# Patient Record
Sex: MALE | Race: BLACK OR AFRICAN AMERICAN | Employment: FULL TIME | URBAN - METROPOLITAN AREA
[De-identification: names, ages, dates, MRNs, and addresses within clinical notes are randomized per-mention and may not be internally consistent; named-entity substitution may affect disease eponyms.]

---

## 2021-03-06 ENCOUNTER — HOSPITAL ENCOUNTER (EMERGENCY)
Age: 31
Discharge: HOME | End: 2021-03-06
Attending: EMERGENCY MEDICINE
Payer: COMMERCIAL

## 2021-03-06 VITALS
DIASTOLIC BLOOD PRESSURE: 86 MMHG | TEMPERATURE: 98.3 F | HEART RATE: 116 BPM | OXYGEN SATURATION: 94 % | RESPIRATION RATE: 20 BRPM | HEIGHT: 65 IN | BODY MASS INDEX: 37.49 KG/M2 | SYSTOLIC BLOOD PRESSURE: 130 MMHG | WEIGHT: 225 LBS

## 2021-03-06 DIAGNOSIS — R52 BODY ACHES: ICD-10-CM

## 2021-03-06 DIAGNOSIS — U07.1 COVID-19 VIRUS INFECTION: Primary | ICD-10-CM

## 2021-03-06 DIAGNOSIS — R11.0 NAUSEA WITHOUT VOMITING: ICD-10-CM

## 2021-03-06 DIAGNOSIS — R50.9 FEVER, UNSPECIFIED FEVER CAUSE: ICD-10-CM

## 2021-03-06 PROCEDURE — 99283 EMERGENCY DEPT VISIT LOW MDM: CPT

## 2021-03-06 RX ORDER — IBUPROFEN 800 MG/1
800 TABLET ORAL
Qty: 20 TAB | Refills: 0 | OUTPATIENT
Start: 2021-03-06 | End: 2021-03-06 | Stop reason: SDUPTHER

## 2021-03-06 RX ORDER — IBUPROFEN 800 MG/1
800 TABLET ORAL
Qty: 20 TAB | Refills: 0 | Status: SHIPPED | OUTPATIENT
Start: 2021-03-06 | End: 2021-03-13

## 2021-03-06 RX ORDER — ONDANSETRON 4 MG/1
4 TABLET, ORALLY DISINTEGRATING ORAL
Qty: 8 TAB | Refills: 0 | OUTPATIENT
Start: 2021-03-06 | End: 2021-03-06 | Stop reason: SDUPTHER

## 2021-03-06 RX ORDER — ONDANSETRON 4 MG/1
4 TABLET, ORALLY DISINTEGRATING ORAL
Qty: 8 TAB | Refills: 0 | Status: SHIPPED | OUTPATIENT
Start: 2021-03-06

## 2021-03-07 ENCOUNTER — PATIENT OUTREACH (OUTPATIENT)
Dept: CASE MANAGEMENT | Age: 31
End: 2021-03-07

## 2021-03-07 NOTE — ED TRIAGE NOTES
Patient reports testing positive for Covid on Sunday. Stated that feels generalized body aches, fever at home, shortness of breath, sweating. Denies chest pain, vomiting,and nausea. Patient is 94% on room air in ED after ambulating.

## 2021-03-07 NOTE — ED PROVIDER NOTES
35yo M with known COVID + 6 days ago. Pt thinks he might be dehydrated. Continues to have sob and chills. + bodyaches. + cough. Temp at home was 103 yesterday. Feels better after taking Tylenol. No vomiting.  + diarrhea. No past medical history on file. Past Surgical History:   Procedure Laterality Date    HX TONSILLECTOMY           No family history on file. Social History     Socioeconomic History    Marital status: SINGLE     Spouse name: Not on file    Number of children: Not on file    Years of education: Not on file    Highest education level: Not on file   Occupational History    Not on file   Social Needs    Financial resource strain: Not on file    Food insecurity     Worry: Not on file     Inability: Not on file    Transportation needs     Medical: Not on file     Non-medical: Not on file   Tobacco Use    Smoking status: Never Smoker   Substance and Sexual Activity    Alcohol use: Not on file     Comment: social    Drug use: Not on file    Sexual activity: Not on file   Lifestyle    Physical activity     Days per week: Not on file     Minutes per session: Not on file    Stress: Not on file   Relationships    Social connections     Talks on phone: Not on file     Gets together: Not on file     Attends Mandaeism service: Not on file     Active member of club or organization: Not on file     Attends meetings of clubs or organizations: Not on file     Relationship status: Not on file    Intimate partner violence     Fear of current or ex partner: Not on file     Emotionally abused: Not on file     Physically abused: Not on file     Forced sexual activity: Not on file   Other Topics Concern    Not on file   Social History Narrative    Not on file         ALLERGIES: Carrot    Review of Systems   Constitutional: Positive for chills and fever. Negative for diaphoresis. HENT: Negative for facial swelling. Eyes: Negative for visual disturbance.    Respiratory: Positive for cough. Cardiovascular: Negative for chest pain. Gastrointestinal: Positive for nausea. Negative for abdominal pain. Genitourinary: Negative for dysuria. Musculoskeletal: Negative for joint swelling. Skin: Negative for rash. Neurological: Negative for headaches. Hematological: Negative for adenopathy. Psychiatric/Behavioral: Negative for suicidal ideas. There were no vitals filed for this visit. Physical Exam  Vitals signs and nursing note reviewed. Constitutional:       General: He is not in acute distress. Appearance: He is well-developed. HENT:      Head: Normocephalic and atraumatic. Eyes:      Pupils: Pupils are equal, round, and reactive to light. Neck:      Musculoskeletal: Normal range of motion and neck supple. Cardiovascular:      Rate and Rhythm: Tachycardia present. Pulmonary:      Effort: Pulmonary effort is normal. No respiratory distress. Abdominal:      General: There is no distension. Musculoskeletal: Normal range of motion. Skin:     General: Skin is warm and dry. Neurological:      Mental Status: He is alert and oriented to person, place, and time. MDM  Number of Diagnoses or Management Options  Body aches  COVID-19 virus infection  Fever, unspecified fever cause  Nausea without vomiting  Diagnosis management comments: A:  Well appearing in no distress. Unremarkable VS except for mild tachycardia. Lungs clear. Stable for discharge home and continued outpatient treatment.          Procedures

## 2021-03-08 NOTE — PROGRESS NOTES
Patient contacted regarding [facility]. Discussed COVID-19 related testing which was not done at this time. Test results were not done. Patient informed of results, if available? n/a Reports tested positive last . LPN Care Coordinator contacted the patient by telephone to perform post discharge assessment. Call within 2 business days of discharge: Yes Verified name and  with patient as identifiers. Provided introduction to self, and explanation of the CTN/ACM role, and reason for call due to risk factors for infection and/or exposure to COVID-19. Symptoms reviewed with patient who verbalized the following symptoms: pain or aching joints and no worsening symptoms      Due to no new or worsening symptoms encounter was not routed to provider for escalation. Discussed follow-up appointments. If no appointment was previously scheduled, appointment scheduling offered:  [facility] follow up appointment(s): No future appointments. Non-[facility] follow up appointment(s): Reports will establish care with a primary care doctor after quarantine period. Advance Care Planning:   Does patient have an Advance Directive:  decision maker updated. Patient has following risk factors of: no known risk factors. LPN CC reviewed discharge instructions, medical action plan and red flags such as increased shortness of breath, increasing fever and signs of decompensation with patient who verbalized understanding. Discussed exposure protocols and quarantine with CDC Guidelines What to do if you are sick with coronavirus disease .  Patient was given an opportunity for questions and concerns. The patient agrees to contact the [facility] exposure line [phone], [facility] department [facility]  ([phone] and PCP office for questions related to their healthcare. LPN CC provided contact information for future needs.     Reviewed and educated patient on any new and changed medications related to discharge diagnosis     Was patient discharged with a pulse oximeter? no Discussed and confirmed pulse oximeter discharge instructions and when to notify provider or seek emergency care. Patient/family/caregiver given information for [employer] and agrees to enroll yes  Patient's preferred e-mail: n/a   Patient's preferred phone number: [phone]  Based on Loop alert triggers, patient will be contacted by nurse care manager for worsening symptoms. Pt will be further monitored by COVID Loop Team based on severity of symptoms and risk factors.

## 2021-03-08 NOTE — ACP (ADVANCE CARE PLANNING)
Advance Care Planning   Healthcare Decision Maker:     Rigo Segundo    Click here to complete [place] including selection of the Healthcare Decision Maker Relationship (ie \"Primary\")

## 2024-02-29 ENCOUNTER — HOSPITAL ENCOUNTER (EMERGENCY)
Facility: HOSPITAL | Age: 34
Discharge: HOME | End: 2024-02-29
Attending: EMERGENCY MEDICINE
Payer: COMMERCIAL

## 2024-02-29 VITALS
DIASTOLIC BLOOD PRESSURE: 100 MMHG | WEIGHT: 280 LBS | OXYGEN SATURATION: 98 % | HEART RATE: 88 BPM | RESPIRATION RATE: 16 BRPM | HEIGHT: 66 IN | TEMPERATURE: 98.2 F | BODY MASS INDEX: 45 KG/M2 | SYSTOLIC BLOOD PRESSURE: 142 MMHG

## 2024-02-29 DIAGNOSIS — S09.90XA INJURY OF HEAD, INITIAL ENCOUNTER: Primary | ICD-10-CM

## 2024-02-29 PROCEDURE — 99282 EMERGENCY DEPT VISIT SF MDM: CPT

## 2024-02-29 ASSESSMENT — PAIN - FUNCTIONAL ASSESSMENT: PAIN_FUNCTIONAL_ASSESSMENT: 0-10

## 2024-02-29 ASSESSMENT — PAIN DESCRIPTION - ORIENTATION: ORIENTATION: LEFT

## 2024-02-29 ASSESSMENT — PAIN DESCRIPTION - LOCATION: LOCATION: HEAD

## 2024-02-29 ASSESSMENT — PAIN SCALES - GENERAL: PAINLEVEL_OUTOF10: 2

## 2024-02-29 ASSESSMENT — PAIN DESCRIPTION - DESCRIPTORS: DESCRIPTORS: PRESSURE

## 2024-03-01 NOTE — ED PROVIDER NOTES
[facility] EMERGENCY DEPT  EMERGENCY DEPARTMENT ENCOUNTER      Pt Name: James Mccoy  MRN: [id number]  Birthdate 1990  Date of evaluation: 2/29/2024  Provider: YOLIE Pathak NP    CHIEF COMPLAINT       Chief Complaint   Patient presents with    Head Injury         HISTORY OF PRESENT ILLNESS   (Location/Symptom, Timing/Onset, Context/Setting, Quality, Duration, Modifying Factors, Severity)  Note limiting factors.   33-year-old male with no significant past medical history presents ambulatory to the ER for evaluation of hitting his head while playing around with his partner.  Patient states that he hit his head on the left side of the couch.  Patient denies any loss of consciousness, denies any vomiting, denies any vision change, denies feeling dizzy or lightheaded.  Patient denies any numbness or tingling or weakness to the upper extremities.  No medications taken prior to arrival, patient states that he is just concerned that he may have a concussion.    The history is provided by the patient.         Review of External Medical Records:     Nursing Notes were reviewed.    REVIEW OF SYSTEMS    (2-9 systems for level 4, 10 or more for level 5)     Review of Systems    Except as noted above the remainder of the review of systems was reviewed and negative.       PAST MEDICAL HISTORY   No past medical history on file.      SURGICAL HISTORY       Past Surgical History:   Procedure Laterality Date    TONSILLECTOMY           CURRENT MEDICATIONS       There are no discharge medications for this patient.      ALLERGIES     Allegra [fexofenadine]    FAMILY HISTORY     No family history on file.       SOCIAL HISTORY       Social History     Socioeconomic History    Marital status: Single   Tobacco Use    Smoking status: Never           PHYSICAL EXAM    (up to 7 for level 4, 8 or more for level 5)     ED Triage Vitals   BP Temp Temp src Pulse Respirations SpO2 Height Weight - Scale   02/29/24 2028 02/29/24

## 2024-03-01 NOTE — DISCHARGE INSTRUCTIONS
You may take Tylenol or ibuprofen as needed, please call and establish care with primary care provider.  Practice brain rest with any continued headache as this very well may be a mild concussion, no television screens, decrease noise, dark room, and take over-the-counter medications to help with discomfort.  Return to the ER with any worsening or concerning symptoms.

## 2024-03-01 NOTE — ED TRIAGE NOTES
Pt presents to ER w/ c/o head pressure and h/a after sustaining a head injury to the left temporal area.  States he was \"horse-playing with his partner\" and he hit his head on the couch.